# Patient Record
Sex: MALE | Race: ASIAN | URBAN - METROPOLITAN AREA
[De-identification: names, ages, dates, MRNs, and addresses within clinical notes are randomized per-mention and may not be internally consistent; named-entity substitution may affect disease eponyms.]

---

## 2018-07-27 ENCOUNTER — OFFICE VISIT (OUTPATIENT)
Dept: FAMILY MEDICINE | Facility: CLINIC | Age: 38
End: 2018-07-27
Payer: COMMERCIAL

## 2018-07-27 VITALS
SYSTOLIC BLOOD PRESSURE: 122 MMHG | BODY MASS INDEX: 27.45 KG/M2 | HEART RATE: 114 BPM | DIASTOLIC BLOOD PRESSURE: 90 MMHG | HEIGHT: 60 IN | OXYGEN SATURATION: 98 % | TEMPERATURE: 98 F | RESPIRATION RATE: 18 BRPM | WEIGHT: 139.8 LBS

## 2018-07-27 DIAGNOSIS — M77.8 TENDINITIS OF LEFT WRIST: ICD-10-CM

## 2018-07-27 DIAGNOSIS — M62.830 BACK MUSCLE SPASM: ICD-10-CM

## 2018-07-27 DIAGNOSIS — Z00.00 ROUTINE GENERAL MEDICAL EXAMINATION AT A HEALTH CARE FACILITY: Primary | ICD-10-CM

## 2018-07-27 DIAGNOSIS — R81 GLUCOSURIA: ICD-10-CM

## 2018-07-27 LAB
BUN SERPL-MCNC: 24.9 MG/DL (ref 7–21)
CALCIUM SERPL-MCNC: 9.8 MG/DL (ref 8.5–10.1)
CHLORIDE SERPLBLD-SCNC: 104 MMOL/L (ref 98–110)
CHOLEST SERPL-MCNC: 197.8 MG/DL (ref 0–200)
CHOLEST/HDLC SERPL: 4.3 {RATIO} (ref 0–5)
CO2 SERPL-SCNC: 27.3 MMOL/L (ref 20–32)
CREAT SERPL-MCNC: 0.7 MG/DL (ref 0.7–1.3)
GFR SERPL CREATININE-BSD FRML MDRD: >90 ML/MIN/1.7 M2
GLUCOSE SERPL-MCNC: 126.8 MG'DL (ref 70–99)
HBA1C MFR BLD: 7.7 % (ref 4.1–5.7)
HDLC SERPL-MCNC: 46.1 MG/DL
LDLC SERPL CALC-MCNC: 137 MG/DL (ref 0–129)
POTASSIUM SERPL-SCNC: 3.7 MMOL/DL (ref 3.2–4.6)
SODIUM SERPL-SCNC: 136.4 MMOL/L (ref 132–142)
TRIGL SERPL-MCNC: 73.6 MG/DL (ref 0–150)
VLDL CHOLESTEROL: 14.7 MG/DL (ref 7–32)

## 2018-07-27 RX ORDER — IBUPROFEN 400 MG/1
400 TABLET, FILM COATED ORAL EVERY 6 HOURS PRN
Qty: 90 TABLET | Refills: 1 | Status: SHIPPED | OUTPATIENT
Start: 2018-07-27

## 2018-07-27 NOTE — PATIENT INSTRUCTIONS
- Will call with lab test results  - Try ibuprofen up to 4 tablets a day for pain      Preventive Health Recommendations  Male Ages 26 - 39    Yearly exam:             See your health care provider every year in order to  o   Review health changes.   o   Discuss preventive care.    o   Review your medicines if your doctor has prescribed any.    You should be tested each year for STDs (sexually transmitted diseases), if you re at risk.     After age 35, talk to your provider about cholesterol testing. If you are at risk for heart disease, have your cholesterol tested at least every 5 years.     If you are at risk for diabetes, you should have a diabetes test (fasting glucose).  Shots: Get a flu shot each year. Get a tetanus shot every 10 years.     Nutrition:    Eat at least 5 servings of fruits and vegetables daily.     Eat whole-grain bread, whole-wheat pasta and brown rice instead of white grains and rice.     Get adequate Calcium and Vitamin D.     Lifestyle    Exercise for at least 150 minutes a week (30 minutes a day, 5 days a week). This will help you control your weight and prevent disease.     Limit alcohol to one drink per day.     No smoking.     Wear sunscreen to prevent skin cancer.     See your dentist every six months for an exam and cleaning.

## 2018-07-27 NOTE — PROGRESS NOTES
Preceptor Attestation:   Patient seen, evaluated and discussed with the resident. I have verified the content of the note, which accurately reflects my assessment of the patient and the plan of care.   Supervising Physician:  Ketan Pedraza MD

## 2018-07-27 NOTE — MR AVS SNAPSHOT
After Visit Summary   7/27/2018    Landon Macias    MRN: [id number]           Patient Information     Date Of Birth          1980        Visit Information        Provider Department      7/27/2018 10:00 AM Julio Golden MD [facility]        Today's Diagnoses     Routine general medical examination at a health care facility    -  1    Glucosuria        Back muscle spasm        Tendinitis of left wrist          Care Instructions    - Will call with lab test results  - Try ibuprofen up to 4 tablets a day for pain      Preventive Health Recommendations  Male Ages 26 - 39    Yearly exam:             See your health care provider every year in order to  o   Review health changes.   o   Discuss preventive care.    o   Review your medicines if your doctor has prescribed any.    You should be tested each year for STDs (sexually transmitted diseases), if you re at risk.     After age 35, talk to your provider about cholesterol testing. If you are at risk for heart disease, have your cholesterol tested at least every 5 years.     If you are at risk for diabetes, you should have a diabetes test (fasting glucose).  Shots: Get a flu shot each year. Get a tetanus shot every 10 years.     Nutrition:    Eat at least 5 servings of fruits and vegetables daily.     Eat whole-grain bread, whole-wheat pasta and brown rice instead of white grains and rice.     Get adequate Calcium and Vitamin D.     Lifestyle    Exercise for at least 150 minutes a week (30 minutes a day, 5 days a week). This will help you control your weight and prevent disease.     Limit alcohol to one drink per day.     No smoking.     Wear sunscreen to prevent skin cancer.     See your dentist every six months for an exam and cleaning.             Follow-ups after your visit        Who to contact     Please call your clinic at [phone] to:    Ask questions about your health    Make or cancel appointments    Discuss your medicines    Learn about  "your test results    Speak to your doctor            Additional Information About Your Visit        Xhalehart Information     CrowdChatt is an electronic gateway that provides easy, online access to your medical records. With [employer], you can request a clinic appointment, read your test results, renew a prescription or communicate with your care team.     To sign up for [employer] visit the website at [email or web address]   You will be asked to enter the access code listed below, as well as some personal information. Please follow the directions to create your username and password.     Your access code is: [email or web address]  Expires: 10/25/2018 11:12 AM     Your access code will  in 90 days. If you need help or a new code, please contact your [facility] or call [phone] for assistance.        Care EveryWhere ID     This is your Care EveryWhere ID. This could be used by other organizations to access your [facility] medical records  [id number]        Your Vitals Were     Pulse Temperature Respirations Height Pulse Oximetry BMI (Body Mass Index)    114 98  F (36.7  C) (Oral) 18 4' 11.5\" (151.1 cm) 98% 27.76 kg/m2       Blood Pressure from Last 3 Encounters:   18 122/90   10/15/15 116/81    Weight from Last 3 Encounters:   18 139 lb 12.8 oz (63.4 kg)   10/15/15 143 lb 6.4 oz (65 kg)              We Performed the Following     Basic Metabolic Panel ([facility])     Hemoglobin A1c ([facility])     Lipid Panel ([facility])          Where to get your medicines      These medications were sent to [facility] - [place], [place] - [address], [place] [address]     Phone:  [phone]     ibuprofen 400 MG tablet          Primary Care Provider Office Phone # Fax #    Cruzito Perry [phone]       [facility] [address] [place] [address]        Equal Access to Services     WES PALOMARES AH: Sybil funes " Denise, du knightalis, mery kafrank martin, hakan cabezas ramanwillie laelisabethsachi sudhakar. So [facility] [phone].    ATENCIÓN: Si maria eugenia valentino, tiene a rai disposición servicios gratuitos de asistencia lingüística. Nickie al [phone].    We comply with applicable federal civil rights laws and [place] laws. We do not discriminate on the basis of race, color, national origin, age, disability, sex, sexual orientation, or gender identity.            Thank you!     Thank you for choosing [facility]  for your care. Our goal is always to provide you with excellent care. Hearing back from our patients is one way we can continue to improve our services. Please take a few minutes to complete the written survey that you may receive in the mail after your visit with us. Thank you!             Your Updated Medication List - Protect others around you: Learn how to safely use, store and throw away your medicines at www.disposemymeds.org.          This list is accurate as of 7/27/18 11:12 AM.  Always use your most recent med list.                   Brand Name Dispense Instructions for use Diagnosis    ibuprofen 400 MG tablet    ADVIL/MOTRIN    90 tablet    Take 1 tablet (400 mg) by mouth every 6 hours as needed for moderate pain    Back muscle spasm, Tendinitis of left wrist

## 2018-07-27 NOTE — PROGRESS NOTES
Please call patient to schedule a follow up appointment with me in the next 2 weeks to discuss abnormal test results, including elevated sugar and cholesterol. Please let him know that he doesn't need any medicine or treatment urgently.     Thanks,   Julio Golden MD

## 2018-07-27 NOTE — PROGRESS NOTES
Male Physical Note      Concerns today:   # Chronic lower left-sided back pain. Fell off an elephant about 20 years ago. Has occasional dull aches/spasms of his left lower back. Comes and goes, sometimes has no pain for weeks or months. Has had pain radiating down his left leg, but denies burning, nerve-like pain in awhile. Just feels more achy pain. No weakness, loss of ROM of back, spine pain, incontinence, saddle anesthesia, pain at night, limited function. Doesn't take any meds for pain since it's not severe.     # Left wrist pain. Patient reports about 6 months ago, he noticed he gets mild occasional pain of his left wrist. No injury or trauma. No swelling, redness, numbness or tingling, weakness. Able to use his left wrist and hand fine.     # Glucosuria. Patient had a UA on 6/18/18 for an unknown reason through his work (see EMR), and found to have glucose. Denies any polyuria, polydipsia, polyphagia. No episodes of feeling dizzy, lightheaded or tired. No history of diabetes.     A [employer]  was used for  this visit.     ROS:                      CONSTITUTIONAL: no fatigue, no unexpected change in weight  SKIN: no worrisome rashes, no worrisome moles, no worrisome lesions  EYES: no acute vision problems or changes  ENT: no ear problems, no mouth problems, no throat problems  RESP: no significant cough, no shortness of breath  CV: no chest pain, no palpitations, no new or worsening peripheral edema  GI: no nausea, no vomiting, no constipation, no diarrhea  : no frequency, no dysuria, no hematuria  NEURO: no weakness, no dizziness, no syncope, no headaches    History reviewed. No pertinent past medical history.   Never been hospitalized. No chronic meds.      Family History   Problem Relation Age of Onset     Diabetes No family hx of      Coronary Artery Disease No family hx of      Hypertension No family hx of      Other Cancer No family hx of      Asthma No family hx of      Cancer No family hx of  "     HEART DISEASE No family hx of    Reviewed no other significant FH           Family History and Past Medical History reviewed and unchanged/updated.    Social History   Substance Use Topics     Smoking status: Current Every Day Smoker     Packs/day: 1.00     Years: 10.00     Smokeless tobacco: Never Used     Alcohol use No       Children? 4 kids (11 yo, 12 yo, 7 yo, 18 mo)    Has anyone hurt you physically, for example by pushing, hitting, slapping or kicking you or forcing you to have sex? Denies  Do you feel threatened or controlled by a partner, ex-partner or anyone in your life? Denies    RISK BEHAVIORS AND HEALTHY HABITS:  Tobacco Use/Smoking: About 4 cigarrettes to 1 PPD, starting when he was about 18 yo  Illicit Drug Use: None  ETOH: None  Do you use alcohol? No  Sexually Active: Yes  Diet (5-7 servings of fruits/veg daily): Yes   Exercise (30 min accumulated most days):No  Dental Care: Yes   Calcium 1500 mg/d:  No  Seat Belt Use: Yes     Immunization History   Administered Date(s) Administered     Influenza Vaccine, 3 YRS +, IM (QUADRIVALENT W/PRESERVATIVES) 10/15/2015, 11/14/2016     Tdap (Adacel,Boostrix) 01/26/2015   Reviewed Immunization Record Today    EXAMINATION:  /90  Pulse 114  Temp 98  F (36.7  C) (Oral)  Resp 18  Ht 4' 11.5\" (151.1 cm)  Wt 139 lb 12.8 oz (63.4 kg)  SpO2 98%  BMI 27.76 kg/m2  GENERAL: healthy, alert and no distress  EYES: Eyes grossly normal to inspection, extraocular movements - intact, and PERRL  HENT: ear canals- normal; TMs- normal; Nose- normal; Mouth- no ulcers, no lesions  NECK: no tenderness, no adenopathy, no asymmetry, no masses, no stiffness; thyroid- normal to palpation  RESP: lungs clear to auscultation - no rales, no rhonchi, no wheezes  CV: regular rates and rhythm, normal S1 S2, no S3 or S4 and no murmur, no click or rub -  ABDOMEN: soft, no tenderness, no  hepatosplenomegaly, no masses, normal bowel sounds  MS: extremities- no gross " deformities noted, no edema. Mild diffuse tenderness along ulnar aspect of left wrist; no bony tenderness, normal inspection, normal ROM and strength. No spine tenderness; normal ROM and strength of back, negative straight leg test, only mild left sided para spinal tenderness, normal inspection.   SKIN: no suspicious lesions, no rashes  NEURO: strength and tone- normal, sensory exam- grossly normal, mentation- intact, speech- normal, reflexes- symmetric  BACK: no CVA tenderness, no paralumbar tenderness  - male: deferred by patient  RECTAL- male: deferred by patient  PSYCH: Alert and oriented; speech- coherent , normal rate and volume; able to articulate logical thoughts, able to abstract reason, no tangential thoughts, affect- normal  LYMPHATICS: ant. cervical- normal, supraclavicular- normal    ASSESSMENT:  1. Health Care Maintenance: Benign Physical Exam    PLAN:  1. Lipids, A1c, BMP ordered for glucosuria concerning for T2DM: A1c elevated at 7.7, LDL elevated at 137  2. Ibuprofen PRN for suspected left wrist tendonitis and intermittent lower back spasms  3. Return to clinic in 2 weeks to discuss new diagnosis of T2DM, HLD and for tobacco cessation, ASA, lifestyle changes, possible pharmacotherapy    Staffed with Dr. Pedraza.     Julio Golden, PGY2  [facility]

## 2018-08-13 ENCOUNTER — OFFICE VISIT (OUTPATIENT)
Dept: FAMILY MEDICINE | Facility: CLINIC | Age: 38
End: 2018-08-13
Payer: COMMERCIAL

## 2018-08-13 VITALS
WEIGHT: 142 LBS | OXYGEN SATURATION: 98 % | DIASTOLIC BLOOD PRESSURE: 82 MMHG | HEART RATE: 104 BPM | HEIGHT: 60 IN | RESPIRATION RATE: 16 BRPM | TEMPERATURE: 98.9 F | BODY MASS INDEX: 27.88 KG/M2 | SYSTOLIC BLOOD PRESSURE: 124 MMHG

## 2018-08-13 DIAGNOSIS — E11.9 TYPE 2 DIABETES MELLITUS WITHOUT COMPLICATION, WITHOUT LONG-TERM CURRENT USE OF INSULIN (H): ICD-10-CM

## 2018-08-13 LAB
CREAT UR-MCNC: 88.6 MG/DL
MICROALBUMIN UR-MCNC: 1.06 MG/DL (ref 0–1.99)
MICROALBUMIN/CREAT UR: 12 MG/G

## 2018-08-13 RX ORDER — ATORVASTATIN CALCIUM 20 MG/1
20 TABLET, FILM COATED ORAL DAILY
Qty: 90 TABLET | Refills: 1 | Status: SHIPPED | OUTPATIENT
Start: 2018-08-13

## 2018-08-13 NOTE — LETTER
August 14, 2018      Landon Colleen  [address]   [place] [address]        Dear Landon,    Here is the information regarding your upcoming appointment    OPHTHALMOLOGY ADULT REFERRAL    [facility] [address], [address]  [place] [address]    Appointment:  Tuesday August 21, 2018  Arrival Time:  1:00 pm  Provider:  ELDA ALBARRAN O.D.    Please bring a copy of your medication list, insurance card and photo ID     A Diana  will be request for your appointment    If you cannot make this appointment, please call [phone] to reschedule    Thank you,    [facility]  Referral Department  [phone]

## 2018-08-13 NOTE — NURSING NOTE
Due to patient being non-English speaking/uses sign language, an  was used for this visit. Only for face-to-face interpretation by an external agency, date and length of interpretation can be found on the scanned worksheet.     name: Refugio Don  Agency: [employer]  Language: Diana   Telephone number: [phone]  Type of interpretation: Face-to-face, spoken

## 2018-08-13 NOTE — PATIENT INSTRUCTIONS
Metformin dosing: Take 1 tab(500mg) once in the morning for 1 week, then take 1 tab in the morning and one in the evening for 1 week, then 2 tabs in the morning and one in the evening for 1 week, then take 2 tabs in the morning and 2 in the evening.    Take atorvastatin 20mg (lipitor) in the evening.     -Follow-up in 3 months.    -We will help to schedule your follow-up diabetes education visit.     OPHTHALMOLOGY ADULT REFERRAL    [facility] [address], [place] [address]    Appointment:  Tuesday August 21, 2018  Arrival Time:  1:00 pm  Provider:  ELDA ALBARRAN O.D.    Please bring a copy of your medication list, insurance card and photo ID     A Diana  will be request for your appointment    If you cannot make this appointment, please call [phone] to reschedule    August 14, 2018 at 1:24 pm Referral, demographics and medication list faxed to [phone]    Called [employer] for a Diana  ID #[id number] - patient aware - no additional questions - referral letter mailed as well

## 2018-08-13 NOTE — PROGRESS NOTES
ASSESSMENT AND PLAN      Landon was seen today for recheck.    Diagnoses and all orders for this visit:    Type 2 diabetes mellitus without complication, without long-term current use of insulin (H): Had long discussion with patient regarding elevated blood sugars and disease process.  Patient is open to starting metformin as well as atorvastatin.  Will check a urine microalbumin at this time.  Will also send patient for ophthalmology referral.  Patient to schedule diabetes education in the next few weeks.  Patient will follow-up with me in 3 months.  -     OPHTHALMOLOGY ADULT REFERRAL; Future  -     Microalbumin Random Ur ([facility])  -     atorvastatin (LIPITOR) 20 MG tablet; Take 1 tablet (20 mg) by mouth daily  -     metFORMIN (GLUCOPHAGE) 500 MG tablet; Take 1 tab(500mg) once in the morning for 1 week, then take 1 tab in the morning and one in the evening for 1 week, then 2 tabs in the morning and one in the evening for 1 week, then take 2 tabs in the morning and 2 in the evening.      Cruzito Perry MD PGY3  [facility]                SUBJECTIVE       Landon Macias is a 37 year old  male with a PMH significant for:     Patient Active Problem List   Diagnosis     Health examination of defined subpopulation     Nonspecific elevation of levels of transaminase or lactic acid dehydrogenase (LDH)     He presents for follow-up of results.  Patient was seen on 7/27 for a complete physical exam, routine testing revealed a hemoglobin A1c of 7.7% as well as an elevated LDL to 137.  Patient currently denies any chest pain, shortness of breath vision changes, headache, dizziness, numbness and tingling in the arms or legs, polydipsia, or polyuria.  His mother and father both to have a history of diabetes.  Patient has a poor understanding of what diabetes is and how the disease process works.    Patient currently smokes 1 pack per day, denies any alcohol or other drug use.    PMH, Medications and Allergies were  "reviewed and updated as needed.            OBJECTIVE     Vitals:    08/13/18 0923   BP: 124/82   Pulse: 104   Resp: 16   Temp: 98.9  F (37.2  C)   TempSrc: Oral   SpO2: 98%   Weight: 142 lb (64.4 kg)   Height: 4' 11.5\" (151.1 cm)     Body mass index is 28.2 kg/(m^2).    General: Well-appearing, no acute distress, answering questions appropriately.  Neck: No thyromegaly, no lymphadenopathy.  CV: S1, S2, regular rate and rhythm.  Respiratory: Clear to auscultation bilaterally.  No crackles or wheezes.  Abdomen: Soft, nontender, nondistended.  Bowel sounds positive.    No results found for this or any previous visit (from the past 24 hour(s)).          "

## 2018-08-13 NOTE — MR AVS SNAPSHOT
After Visit Summary   8/13/2018    Landon Macias    MRN: [id number]           Patient Information     Date Of Birth          1980        Visit Information        Provider Department      8/13/2018 9:20 AM Cruzito Perry MD [facility]        Today's Diagnoses     Type 2 diabetes mellitus without complication, without long-term current use of insulin (H)          Care Instructions    Metformin dosing: Take 1 tab(500mg) once in the morning for 1 week, then take 1 tab in the morning and one in the evening for 1 week, then 2 tabs in the morning and one in the evening for 1 week, then take 2 tabs in the morning and 2 in the evening.    Take atorvastatin 20mg (lipitor) in the evening.     -Follow-up in 3 months.    -We will help to schedule your follow-up diabetes education visit.           Follow-ups after your visit        Additional Services     OPHTHALMOLOGY ADULT REFERRAL       Patient prefers to be called    Reason for Referral: Diabetic eye check     needed: Yes  Language: Diana    May leave message on voicemail: Yes                  Future tests that were ordered for you today     Open Future Orders        Priority Expected Expires Ordered    OPHTHALMOLOGY ADULT REFERRAL Routine  8/13/2019 8/13/2018            Who to contact     Please call your clinic at [phone] to:    Ask questions about your health    Make or cancel appointments    Discuss your medicines    Learn about your test results    Speak to your doctor            Additional Information About Your Visit        MyChart Information     [employer] is an electronic gateway that provides easy, online access to your medical records. With [employer]+, you can request a clinic appointment, read your test results, renew a prescription or communicate with your care team.     To sign up for uniRowt visit the website at [email or web address]   You will be asked to enter the access code listed below, as well as some  "personal information. Please follow the directions to create your username and password.     Your access code is: [email or web address]  Expires: 10/25/2018 11:12 AM     Your access code will  in 90 days. If you need help or a new code, please contact your [facility] or call [phone] for assistance.        Care EveryWhere ID     This is your Care EveryWhere ID. This could be used by other organizations to access your [facility] medical records  [id number]        Your Vitals Were     Pulse Temperature Respirations Height Pulse Oximetry BMI (Body Mass Index)    104 98.9  F (37.2  C) (Oral) 16 4' 11.5\" (151.1 cm) 98% 28.2 kg/m2       Blood Pressure from Last 3 Encounters:   18 124/82   18 122/90   10/15/15 116/81    Weight from Last 3 Encounters:   18 142 lb (64.4 kg)   18 139 lb 12.8 oz (63.4 kg)   10/15/15 143 lb 6.4 oz (65 kg)              We Performed the Following     Microalbumin Random Ur ([facility])          Today's Medication Changes          These changes are accurate as of 18 10:15 AM.  If you have any questions, ask your nurse or doctor.               Start taking these medicines.        Dose/Directions    atorvastatin 20 MG tablet   Commonly known as:  LIPITOR   Used for:  Type 2 diabetes mellitus without complication, without long-term current use of insulin (H)   Started by:  Cruzito Perry MD        Dose:  20 mg   Take 1 tablet (20 mg) by mouth daily   Quantity:  90 tablet   Refills:  1       metFORMIN 500 MG tablet   Commonly known as:  GLUCOPHAGE   Used for:  Type 2 diabetes mellitus without complication, without long-term current use of insulin (H)   Started by:  Cruzito Perry MD        Take 1 tab(500mg) once in the morning for 1 week, then take 1 tab in the morning and one in the evening for 1 week, then 2 tabs in the morning and one in the evening for 1 week, then take 2 tabs in the morning and 2 in the evening.   Quantity: "  360 tablet   Refills:  3            Where to get your medicines      These medications were sent to [facility] - [place], [place] - [address], [place] [address]     Phone:  [phone]     atorvastatin 20 MG tablet         Some of these will need a paper prescription and others can be bought over the counter.  Ask your nurse if you have questions.     Bring a paper prescription for each of these medications     metFORMIN 500 MG tablet                Primary Care Provider Office Phone # Fax #    Cruzito Curly Perry [phone]       [facility] [address] [place] [address]        Equal Access to Services     [facility]: Hadii kev guerra hadasho Soomaali, waaxda luqadaha, qaybta kaalmada adeegyada, hakan chahal . So [facility] [phone].    ATENCIÓN: Si habla español, tiene a rai disposición servicios gratuitos de asistencia lingüística. [facility] [phone].    We comply with applicable federal civil rights laws and [place] laws. We do not discriminate on the basis of race, color, national origin, age, disability, sex, sexual orientation, or gender identity.            Thank you!     Thank you for choosing [facility]  for your care. Our goal is always to provide you with excellent care. Hearing back from our patients is one way we can continue to improve our services. Please take a few minutes to complete the written survey that you may receive in the mail after your visit with us. Thank you!             Your Updated Medication List - Protect others around you: Learn how to safely use, store and throw away your medicines at www.disposemymeds.org.          This list is accurate as of 8/13/18 10:15 AM.  Always use your most recent med list.                   Brand Name Dispense Instructions for use Diagnosis    atorvastatin 20 MG tablet    LIPITOR    90 tablet    Take 1 tablet (20 mg) by mouth daily    Type 2  diabetes mellitus without complication, without long-term current use of insulin (H)       ibuprofen 400 MG tablet    ADVIL/MOTRIN    90 tablet    Take 1 tablet (400 mg) by mouth every 6 hours as needed for moderate pain    Back muscle spasm, Tendinitis of left wrist       metFORMIN 500 MG tablet    GLUCOPHAGE    360 tablet    Take 1 tab(500mg) once in the morning for 1 week, then take 1 tab in the morning and one in the evening for 1 week, then 2 tabs in the morning and one in the evening for 1 week, then take 2 tabs in the morning and 2 in the evening.    Type 2 diabetes mellitus without complication, without long-term current use of insulin (H)

## 2018-08-13 NOTE — PROGRESS NOTES
"Preceptor attestation:  Vital signs reviewed: /82  Pulse 104  Temp 98.9  F (37.2  C) (Oral)  Resp 16  Ht 4' 11.5\" (151.1 cm)  Wt 142 lb (64.4 kg)  SpO2 98%  BMI 28.2 kg/m2    Patient seen, evaluated, and discussed with the resident.  I have verified the content of the note, which accurately reflects my assessment of the patient and the plan of care.    Supervising physician: Chloe Turner MD  [facility]  "

## 2018-08-14 NOTE — PROGRESS NOTES
Please have  call patient with the following:    Mauricio Macias,    The results of your urine testing have come back. They appear normal at this time, and it does not appear that you have any kidney disease. If you have any questions, feel free to call the clinic or schedule another appointment to see me. I will see you in 3 months for your diabetes follow-up.    Cruzito Perry MD PGY3  [facility]

## 2018-08-21 ENCOUNTER — TRANSFERRED RECORDS (OUTPATIENT)
Dept: HEALTH INFORMATION MANAGEMENT | Facility: CLINIC | Age: 38
End: 2018-08-21

## 2018-09-24 ENCOUNTER — ALLIED HEALTH/NURSE VISIT (OUTPATIENT)
Dept: FAMILY MEDICINE | Facility: CLINIC | Age: 38
End: 2018-09-24
Payer: COMMERCIAL

## 2018-09-24 DIAGNOSIS — Z71.89 DIABETES EDUCATION, ENCOUNTER FOR: Primary | ICD-10-CM

## 2018-09-24 NOTE — NURSING NOTE
Diabetic Education RN Note for Nutrition  Meeting lasted: 25 mins  Patient current diet:  1. Pt works night shift--11pm-7pm  2. He only eats two meals a day--once at 9am and again at 7pm, meals consists of a bowl of rice with vegetables and meat.   3. Pt states he usually drinks water--once in a long water he will drink a monster drink.   Discussed what is diabetes and the disease process. Highlighted the importance of eating regular meals. Discussed the benefits of increasing water intake. Discussed portion sizes. Discussed how to do foot care. Discussed avoiding all sugared drinks. Basic meal planning ideas handout was given to the patient and a guide to regular physical activity. (handouts were given to patient on these topics)  We also discussed his metformin regimen-make sure pt understand Dr. Perry's instructions.   Pt verbalized understanding of education given today.  Advised patient to call the clinic with any questions or concerns.   Patient goals:  1. Spread out his meals (3 meals a day instead of 2)  2. Be mindful of his rice portion--up to 3 servings only of rice

## 2018-09-24 NOTE — MR AVS SNAPSHOT
After Visit Summary   2018    Landon Macias    MRN: [id number]           Patient Information     Date Of Birth          1980        Visit Information        Provider Department      2018 9:30 AM Nurse[facility]ianluca [facility]        Today's Diagnoses     Diabetes education, encounter for    -  1       Follow-ups after your visit        Who to contact     Please call your clinic at [phone] to:    Ask questions about your health    Make or cancel appointments    Discuss your medicines    Learn about your test results    Speak to your doctor            Additional Information About Your Visit        MyChart Information     [employer] is an electronic gateway that provides easy, online access to your medical records. With [employer], you can request a clinic appointment, read your test results, renew a prescription or communicate with your care team.     To sign up for [employer] visit the website at [email or web address]   You will be asked to enter the access code listed below, as well as some personal information. Please follow the directions to create your username and password.     Your access code is: [email or web address]  Expires: 10/25/2018 11:12 AM     Your access code will  in 90 days. If you need help or a new code, please contact your [facility] or call [phone] for assistance.        Care EveryWhere ID     This is your Care EveryWhere ID. This could be used by other organizations to access your [place] medical records  [id number]         Blood Pressure from Last 3 Encounters:   18 124/82   18 122/90   10/15/15 116/81    Weight from Last 3 Encounters:   18 142 lb (64.4 kg)   18 139 lb 12.8 oz (63.4 kg)   10/15/15 143 lb 6.4 oz (65 kg)              Today, you had the following     No orders found for display       Primary Care Provider Office Phone # Fax #    Cruzito Perry [phone]       [facility] [address] [place] [address]        Equal Access to Services     WES PALOMARES : Hadii aad ku hadelsieannette Walker, du israel, qazarina atkinsmaneville martin, hakan ed la fuente. So [facility] [phone].    ATENCIÓN: Si habla español, tiene a rai disposición servicios gratuitos de asistencia lingüística. Annaame al [phone].    We comply with applicable federal civil rights laws and [place] laws. We do not discriminate on the basis of race, color, national origin, age, disability, sex, sexual orientation, or gender identity.            Thank you!     Thank you for choosing [facility]  for your care. Our goal is always to provide you with excellent care. Hearing back from our patients is one way we can continue to improve our services. Please take a few minutes to complete the written survey that you may receive in the mail after your visit with us. Thank you!             Your Updated Medication List - Protect others around you: Learn how to safely use, store and throw away your medicines at www.disposemymeds.org.          This list is accurate as of 9/24/18  3:59 PM.  Always use your most recent med list.                   Brand Name Dispense Instructions for use Diagnosis    atorvastatin 20 MG tablet    LIPITOR    90 tablet    Take 1 tablet (20 mg) by mouth daily    Type 2 diabetes mellitus without complication, without long-term current use of insulin (H)       ibuprofen 400 MG tablet    ADVIL/MOTRIN    90 tablet    Take 1 tablet (400 mg) by mouth every 6 hours as needed for moderate pain    Back muscle spasm, Tendinitis of left wrist       metFORMIN 500 MG tablet    GLUCOPHAGE    360 tablet    Take 1 tab(500mg) once in the morning for 1 week, then take 1 tab in the morning and one in the evening for 1 week, then 2 tabs in the morning and one in the evening for 1 week, then take 2 tabs in the morning and 2 in the evening.    Type 2 diabetes mellitus without complication,  without long-term current use of insulin (H)

## 2018-09-25 ENCOUNTER — TELEPHONE (OUTPATIENT)
Dept: FAMILY MEDICINE | Facility: CLINIC | Age: 38
End: 2018-09-25

## 2018-09-25 NOTE — TELEPHONE ENCOUNTER
See nurse visit from yesterday. Pt has been been taking metfromin 2 tabs in morning only the last 2 weeks. Advised him to start taking 2 tabs in AM and 1 tab in PM for one wk then 2 tabs in AM and 2 tabs in PM.   F/u with you in November, call with any questions/concerns.     Routed to Dr. Perry. /GEOVANNA Barkley

## 2024-12-04 ENCOUNTER — MEDICAL CORRESPONDENCE (OUTPATIENT)
Dept: HEALTH INFORMATION MANAGEMENT | Facility: CLINIC | Age: 44
End: 2024-12-04

## 2024-12-04 ENCOUNTER — TRANSFERRED RECORDS (OUTPATIENT)
Dept: HEALTH INFORMATION MANAGEMENT | Facility: CLINIC | Age: 44
End: 2024-12-04

## 2024-12-04 LAB
CHOLESTEROL (EXTERNAL): 228 MG/DL
CREATININE (EXTERNAL): 0.67 MG/DL (ref 0.6–1.29)
GFR ESTIMATED (EXTERNAL): 118 ML/MIN/1.73M2
GLUCOSE (EXTERNAL): 198 MG/DL (ref 65–99)
HDLC SERPL-MCNC: 50 MG/DL
LDL CHOLESTEROL CALCULATED (EXTERNAL): 155 MG/DL
NON HDL CHOLESTEROL (EXTERNAL): 178 MG/DL
POTASSIUM (EXTERNAL): 3.9 MMOL/L (ref 3.5–5.3)
TRIGLYCERIDES (EXTERNAL): 116 MG/DL
TSH SERPL-ACNC: 1.93 MIU/L (ref 0.4–4.5)

## 2024-12-12 ENCOUNTER — TRANSCRIBE ORDERS (OUTPATIENT)
Dept: OTHER | Age: 44
End: 2024-12-12

## 2024-12-12 DIAGNOSIS — E11.9 DIABETES MELLITUS TYPE II, NON INSULIN DEPENDENT (H): Primary | ICD-10-CM
